# Patient Record
(demographics unavailable — no encounter records)

---

## 2024-10-22 NOTE — PHYSICAL EXAM
[General Appearance - In No Acute Distress] : no acute distress [Edema] : no peripheral edema [] : no respiratory distress [Abdomen Soft] : soft [Normal Station and Gait] : the gait and station were normal for the patient's age [Skin Color & Pigmentation] : normal skin color and pigmentation [No Focal Deficits] : no focal deficits [Oriented To Time, Place, And Person] : oriented to person, place, and time

## 2024-10-24 NOTE — HISTORY OF PRESENT ILLNESS
[FreeTextEntry1] : Here for 3 months follow up visit. BPH: remains tamsulosin 0.4mg and Finasteride 5mg daily.  Urinary function:   PVR-

## 2024-10-24 NOTE — ASSESSMENT
[FreeTextEntry1] : BPH: Continue tamsulosin and finasteride as prescribed. PVR reviewed.  Follow up in

## 2024-12-04 NOTE — HISTORY OF PRESENT ILLNESS
[FreeTextEntry1] : 77-year-old male comes in complaining of improved exercise tolerance as he is continuing to lose weight has mild dyspnea on exertion no chest pain he has known history of diabetes hypertension hyperlipidemia on treatment and an ascending aortic aneurysm for which she is followed by CT VS surgery at [facility] last his upcoming visit is this June 2025.  There has been no claudication and no chest pain

## 2024-12-04 NOTE — REVIEW OF SYSTEMS
[Weight Loss (___ Lbs)] : [unfilled] ~Ulb weight loss [Dyspnea on exertion] : dyspnea during exertion [Cough] : cough [Negative] : Psychiatric [SOB] : no shortness of breath [Chest Discomfort] : no chest discomfort [Lower Ext Edema] : no extremity edema [Leg Claudication] : no intermittent leg claudication [Palpitations] : no palpitations [Orthopnea] : no orthopnea [PND] : no PND [Syncope] : no syncope

## 2024-12-04 NOTE — PHYSICAL EXAM
[Well Developed] : well developed [Obese] : obese [Normal Conjunctiva] : normal conjunctiva [Normal Venous Pressure] : normal venous pressure [No Carotid Bruit] : no carotid bruit [Normal S1, S2] : normal S1, S2 [S4] : S4 [Murmur] : murmur [Clear Lung Fields] : clear lung fields [Good Air Entry] : good air entry [Soft] : abdomen soft [Non Tender] : non-tender [Normal Gait] : normal gait [Normal] : alert and oriented, normal memory [de-identified] : ETHEL

## 2024-12-04 NOTE — CARDIOLOGY SUMMARY
[de-identified] : EKG done in July 2024 normal sinus rhythm at 80 bpm left anterior hemiblock poor R wave progression. [de-identified] : 12/4/2024 CONCLUSIONS: 1. Technically difficult image quality. 2. Left ventricular systolic function is normal with an ejection fraction of 76 % by Butler's method of  disks. 3. Aortic root at the sinuses of Valsalva is dilated, measuring 4.10 cm (indexed 1.83 cm/m). Ascending  aorta is dilated, measuring 3.80 cm (indexed 1.70 cm/m). Aortic arch diameter is dilated, measuring  3.6 cm (indexed 1.62 cm/m). 4. Trileaflet aortic valve with reduced systolic excursion. There is calcification of the aortic valve leaflets.  There is moderate thickening of the aortic valve leaflets. 5. Mild aortic regurgitation. 6. Mild left ventricular hypertrophy. 7. No prior echocardiogram is available for comparison

## 2024-12-04 NOTE — ASSESSMENT
[FreeTextEntry1] : 1 problem shortness of breath on exertion this is improved with weight loss blood pressure control continue current lifestyle changes.  Aortic regurgitation mild on the echocardiogram we will monitor as required  Aneurysm of the ascending aorta appears that at least in the region of Valsalva his aorta measures 24.1 cm we will monitor with a CT as per CT VS surgery.  Hyperlipidemia LDL is at goal continue medications.  Hypertension controlled continue medications.

## 2024-12-04 NOTE — REASON FOR VISIT
[Symptom and Test Evaluation] : symptom and test evaluation [Hyperlipidemia] : hyperlipidemia [Hypertension] : hypertension [Other: ____] : [unfilled] [FreeTextEntry3] : malik ortiz

## 2025-06-05 NOTE — PHYSICAL EXAM
[Well Developed] : well developed [Well Nourished] : well nourished [Normal Conjunctiva] : normal conjunctiva [No Carotid Bruit] : no carotid bruit [Normal Venous Pressure] : normal venous pressure [Normal S1, S2] : normal S1, S2 [S4] : S4 [Murmur] : murmur [Clear Lung Fields] : clear lung fields [Good Air Entry] : good air entry [Soft] : abdomen soft [Non Tender] : non-tender [Normal Gait] : normal gait [No Edema] : no edema [No Cyanosis] : no cyanosis [Normal PT B/L] : normal PT B/L [Normal DP B/L] : normal DP B/L [Normal] : alert and oriented, normal memory [de-identified] : nova

## 2025-06-05 NOTE — REASON FOR VISIT
[Carotid, Aortic and Peripheral Vascular Disease] : carotid, aortic and peripheral vascular disease [FreeTextEntry1] : 78-year-old male with history of hypertension hyperlipidemia and dilated aortic root and diabetes mellitus who was losing weight currently on Wegovy injections and feels much better he has no chest pain no increasing palpitations no short increasing shortness of breath no claudication and no pedal edema.  He is followed by CT VS surgery at [facility] for his aorta once a year

## 2025-06-05 NOTE — CARDIOLOGY SUMMARY
[de-identified] : EKG done today 6/5/2025 reviewed by me reveals normal sinus rhythm at 70 bpm left ant anterior hemiblock low QRS frontal plane voltage. [de-identified] : 12/4/2024 CONCLUSIONS: 1. Technically difficult image quality. 2. Left ventricular systolic function is normal with an ejection fraction of 76 % by Butler's method of  disks. 3. Aortic root at the sinuses of Valsalva is dilated, measuring 4.10 cm (indexed 1.83 cm/m). Ascending  aorta is dilated, measuring 3.80 cm (indexed 1.70 cm/m). Aortic arch diameter is dilated, measuring  3.6 cm (indexed 1.62 cm/m). 4. Trileaflet aortic valve with reduced systolic excursion. There is calcification of the aortic valve leaflets.  There is moderate thickening of the aortic valve leaflets. 5. Mild aortic regurgitation. 6. Mild left ventricular hypertrophy. 7. No prior echocardiogram is available for comparison

## 2025-06-05 NOTE — CARDIOLOGY SUMMARY
[de-identified] : EKG done today 6/5/2025 reviewed by me reveals normal sinus rhythm at 70 bpm left ant anterior hemiblock low QRS frontal plane voltage. [de-identified] : 12/4/2024 CONCLUSIONS: 1. Technically difficult image quality. 2. Left ventricular systolic function is normal with an ejection fraction of 76 % by Butler's method of  disks. 3. Aortic root at the sinuses of Valsalva is dilated, measuring 4.10 cm (indexed 1.83 cm/m). Ascending  aorta is dilated, measuring 3.80 cm (indexed 1.70 cm/m). Aortic arch diameter is dilated, measuring  3.6 cm (indexed 1.62 cm/m). 4. Trileaflet aortic valve with reduced systolic excursion. There is calcification of the aortic valve leaflets.  There is moderate thickening of the aortic valve leaflets. 5. Mild aortic regurgitation. 6. Mild left ventricular hypertrophy. 7. No prior echocardiogram is available for comparison

## 2025-06-05 NOTE — PHYSICAL EXAM
[Well Developed] : well developed [Well Nourished] : well nourished [Normal Conjunctiva] : normal conjunctiva [No Carotid Bruit] : no carotid bruit [Normal Venous Pressure] : normal venous pressure [Normal S1, S2] : normal S1, S2 [S4] : S4 [Murmur] : murmur [Clear Lung Fields] : clear lung fields [Good Air Entry] : good air entry [Soft] : abdomen soft [Non Tender] : non-tender [Normal Gait] : normal gait [No Edema] : no edema [No Cyanosis] : no cyanosis [Normal PT B/L] : normal PT B/L [Normal DP B/L] : normal DP B/L [Normal] : alert and oriented, normal memory [de-identified] : nova

## 2025-06-05 NOTE — ASSESSMENT
[FreeTextEntry1] : Ascending aortic aneurysm followed by CT VS at [facility] we will continue to monitor.  Aortic valve regurgitation mild will check echo early next year or end of this year.  Patient remains asymptomatic  Hyperlipidemia currently controlled on medication we will follow-up with repeat blood test.  Primary hypertension controlled continue current medication.

## 2025-06-29 NOTE — HISTORY OF PRESENT ILLNESS
[FreeTextEntry1] : Here for 6 month follow up. BPH: remains on tamsulosin 0.4 mg and Finasteride 5mg daily. Previously trialed  trospium 20 mg, oxybutynin and myrbetriq for overactive urinary symptoms at night without significant improvement.  Flow is good. Reports bothersome nocturia 2-3 x despite not having fluid close to bedtime. Feels like he is not emptying.  Denies constipation.  PSA - 6.7 ng/mL (recently had labs at PCP office)  H/o diabetes. Last A1C - 6.5%.  PVR - 98 mL

## 2025-06-29 NOTE — ASSESSMENT
[FreeTextEntry1] : PVR reviewed. BPH/LUTS - suspect that nocturia may be secondary to untreated obstructive sleep apnea.  He has been diagnosed but unable to tolerated the CPAP machine at night.  Reviewed physiology of JESSICA which may lead to increased nocturia and overnight urine production. Advised to see sleep physician for additional recommendations. In the interim, will trial twice daily tamsulosin 0.4 mg and continue finasteride 5 mg. Follow up in 6 months.